# Patient Record
Sex: MALE | Race: WHITE | HISPANIC OR LATINO | Employment: STUDENT | URBAN - METROPOLITAN AREA
[De-identification: names, ages, dates, MRNs, and addresses within clinical notes are randomized per-mention and may not be internally consistent; named-entity substitution may affect disease eponyms.]

---

## 2022-10-13 ENCOUNTER — TRANSFERRED RECORDS (OUTPATIENT)
Dept: HEALTH INFORMATION MANAGEMENT | Facility: CLINIC | Age: 27
End: 2022-10-13

## 2022-10-13 LAB
C TRACH DNA SPEC QL PROBE+SIG AMP: NOT DETECTED
N GONORRHOEA DNA SPEC QL PROBE+SIG AMP: NOT DETECTED
SPECIMEN DESCRIP: NORMAL
SPECIMEN DESCRIPTION: NORMAL

## 2022-10-27 ENCOUNTER — TRANSFERRED RECORDS (OUTPATIENT)
Dept: HEALTH INFORMATION MANAGEMENT | Facility: CLINIC | Age: 27
End: 2022-10-27

## 2022-10-27 ENCOUNTER — MEDICAL CORRESPONDENCE (OUTPATIENT)
Dept: HEALTH INFORMATION MANAGEMENT | Facility: CLINIC | Age: 27
End: 2022-10-27

## 2022-10-28 ENCOUNTER — TELEPHONE (OUTPATIENT)
Dept: DERMATOLOGY | Facility: CLINIC | Age: 27
End: 2022-10-28

## 2022-10-28 ENCOUNTER — TRANSCRIBE ORDERS (OUTPATIENT)
Dept: OTHER | Age: 27
End: 2022-10-28

## 2022-10-28 DIAGNOSIS — L98.9 SKIN DISORDER: ICD-10-CM

## 2022-10-28 DIAGNOSIS — B37.49 YEAST DERMATITIS OF PENIS: Primary | ICD-10-CM

## 2022-10-28 NOTE — TELEPHONE ENCOUNTER
This encounter is being sent to inform the clinic that this patient has a referral from Dr Anat Beth MD at [facility] (Phone: [phone], Fax: [phone]) for the diagnoses of Yeast dermatitis of penis; Skin disorder; Pt with stinging/burning of penis, mild improvement with azole/hydrocortisone, but not resolved & returned after he stopped steroid. Also a scaly lesion to left corona query lichen sclorosis. and has requested that this patient be seen within Emergency: 1-2 Days and/or with Emergency: 1-2 Days.  Based on the availability of our provider(s), we are unable to accommodate this request.      Were all sites offered this patient?  Yes - Pt goes to [facility] so wants [facility] location only Please      Does scheduling algorithm request to schedule next available?  Patient appointment has not been scheduled.  Please review the referral request for accommodation and contact the patient.  If unable to accommodate, please resubmit a referral and indicate a preferred partner or affiliate location using Provider Finder or Scheduling Instructions field.        Pt said that next week he can come anytime on Monday or Wednesday or Friday (Pt cannot come on Tuesday or Thursday due to important school classes)   Please call Pt to add on Monday or Wednesday or Friday anytime next week at [facility] with any Dermatology provider Please  Thank you very much!!  Pt can be reached on his cell

## 2022-10-31 ENCOUNTER — TRANSFERRED RECORDS (OUTPATIENT)
Dept: HEALTH INFORMATION MANAGEMENT | Facility: CLINIC | Age: 27
End: 2022-10-31

## 2022-10-31 ENCOUNTER — MEDICAL CORRESPONDENCE (OUTPATIENT)
Dept: HEALTH INFORMATION MANAGEMENT | Facility: CLINIC | Age: 27
End: 2022-10-31

## 2022-11-02 ENCOUNTER — TRANSCRIBE ORDERS (OUTPATIENT)
Dept: OTHER | Age: 27
End: 2022-11-02

## 2022-11-02 DIAGNOSIS — B37.49 YEAST DERMATITIS OF PENIS: Primary | ICD-10-CM

## 2022-11-02 DIAGNOSIS — N34.2 URETHRITIS: ICD-10-CM

## 2022-11-09 ENCOUNTER — OFFICE VISIT (OUTPATIENT)
Dept: DERMATOLOGY | Facility: CLINIC | Age: 27
End: 2022-11-09
Payer: COMMERCIAL

## 2022-11-09 DIAGNOSIS — N48.1 BALANITIS: Primary | ICD-10-CM

## 2022-11-09 PROCEDURE — 99204 OFFICE O/P NEW MOD 45 MIN: CPT | Performed by: DERMATOLOGY

## 2022-11-09 RX ORDER — CLOTRIMAZOLE 1 %
CREAM (GRAM) TOPICAL 2 TIMES DAILY
COMMUNITY

## 2022-11-09 RX ORDER — HYDROXYZINE HYDROCHLORIDE 10 MG/1
TABLET, FILM COATED ORAL
COMMUNITY
Start: 2022-01-04

## 2022-11-09 ASSESSMENT — PAIN SCALES - GENERAL: PAINLEVEL: NO PAIN (0)

## 2022-11-09 NOTE — LETTER
11/9/2022       RE: Luther Lopez  [address] [place] [address]     Dear Colleague,    Thank you for referring your patient, Luther Lopez, to the [facility]. Please see a copy of my visit note below.    [facility] Dermatology Note  Encounter Date: Nov 9, 2022  Office Visit     Dermatology Problem List:  1. Balanitis NOS.   - clotrimazole 1% cream, hydrocortisone   ____________________________________________    Assessment & Plan:    #  Balanitis NOS, resolved on exam today. Suspect irritant dermatitis. New undiagnosed problem with uncertain prognosis. Recommended PRN use of topical therapies if flares. Did ask patient to avoid potential irritants to the area and to contact clinic with Mychart message and photo if rash were to recur and/or become more active. He expressed understanding and agreed to plan.   - Avoid scented lubricants/lotions  - Recommend using non-latex condoms   - Can restart hydrocortisone 2.5% ointment / clotrimazole BID PRN flares  - If symptoms return, recommend patient reach out over MyChart    Procedures Performed:   None.    Follow-up: prn for new or changing lesions    Staff and Scribe:     Scribe Disclosure:  I, LIEN WEDES, am serving as a scribe to document services personally performed by Wood Chamorro MD based on data collection and the provider's statements to me.     Provider Disclosure:   The documentation recorded by the scribe accurately reflects the services I personally performed and the decisions made by me.    Wood Chamorro MD    Department of Dermatology  [facility]: Phone: [phone], Fax:[phone]  [facility]: Phone: [phone] Fax: [phone]  ____________________________________________    CC: Derm Problem  (Pt reports itching, pain, soreness of penis in addition to a spot of concern in the same general area.)    HPI:  Mr. Ltuher Lopez is a(n) 27 year old male who presents today as a new patient for a rash on the groin.    Today, the patient reports itching, pain and soreness in the genital area, particularly around the tip of the penis and in the urethra. It began the first week of October and is slowly resolving. He was treating the area with hydrocortisone and clotrimazole.There was one discolored patch of skin in the area that was concerning to another physician.     Denies any visual symptoms or pain while urinating. He has had urine and STI tests previously that came back negative. He was with a new sexual partner and used latex condoms before these sensations began. Typically he uses non-latex condoms. Notes irritation after having sex.     Patient is otherwise feeling well, without additional skin concerns.    Labs Reviewed:  N/A    Physical Exam:  Vitals: There were no vitals taken for this visit.  SKIN: Focused examination of the mouth and genital was performed.  - Genital skin normal appearing.  - No other lesions of concern on areas examined.     Medications:  Current Outpatient Medications   Medication     clotrimazole (LOTRIMIN) 1 % external cream     hydrOXYzine (ATARAX) 10 MG tablet     No current facility-administered medications for this visit.      Past Medical History:   There is no problem list on file for this patient.    History reviewed. No pertinent past medical history.     CC Anat Beth MD  [address] [address] on close of this encounter.

## 2022-11-09 NOTE — PROGRESS NOTES
[facility] Dermatology Note  Encounter Date: Nov 9, 2022  Office Visit     Dermatology Problem List:  1. Balanitis NOS.   - clotrimazole 1% cream, hydrocortisone   ____________________________________________    Assessment & Plan:    #  Balanitis NOS, resolved on exam today. Suspect irritant dermatitis. New undiagnosed problem with uncertain prognosis. Recommended PRN use of topical therapies if flares. Did ask patient to avoid potential irritants to the area and to contact clinic with Mychart message and photo if rash were to recur and/or become more active. He expressed understanding and agreed to plan.   - Avoid scented lubricants/lotions  - Recommend using non-latex condoms   - Can restart hydrocortisone 2.5% ointment / clotrimazole BID PRN flares  - If symptoms return, recommend patient reach out over MyChart    Procedures Performed:   None.    Follow-up: prn for new or changing lesions    Staff and Scribe:     Scribe Disclosure:  I, LIEN CAI, am serving as a scribe to document services personally performed by Wood Chamorro MD based on data collection and the provider's statements to me.     Provider Disclosure:   The documentation recorded by the scribe accurately reflects the services I personally performed and the decisions made by me.    Wood Chamorro MD    Department of Dermatology  [facility]: Phone: [phone], Fax:[phone]  [facility]: Phone: [phone] Fax: [phone]  ____________________________________________    CC: Derm Problem (Pt reports itching, pain, soreness of penis in addition to a spot of concern in the same general area.)    HPI:  Mr. Luther Lopez is a(n) 27 year old male who presents today as a new patient for a rash on the groin.    Today, the patient reports itching, pain and soreness in the genital area, particularly  around the tip of the penis and in the urethra. It began the first week of October and is slowly resolving. He was treating the area with hydrocortisone and clotrimazole.There was one discolored patch of skin in the area that was concerning to another physician.     Denies any visual symptoms or pain while urinating. He has had urine and STI tests previously that came back negative. He was with a new sexual partner and used latex condoms before these sensations began. Typically he uses non-latex condoms. Notes irritation after having sex.     Patient is otherwise feeling well, without additional skin concerns.    Labs Reviewed:  N/A    Physical Exam:  Vitals: There were no vitals taken for this visit.  SKIN: Focused examination of the mouth and genital was performed.  - Genital skin normal appearing.  - No other lesions of concern on areas examined.     Medications:  Current Outpatient Medications   Medication     clotrimazole (LOTRIMIN) 1 % external cream     hydrOXYzine (ATARAX) 10 MG tablet     No current facility-administered medications for this visit.      Past Medical History:   There is no problem list on file for this patient.    History reviewed. No pertinent past medical history.     CC Anat Beth MD  [address] [address] on close of this encounter.

## 2022-11-09 NOTE — NURSING NOTE
Chief Complaint   Patient presents with     Derm Problem     Pt reports itching, pain, soreness of penis in addition to a spot of concern in the same general area.     Amari Coffey, EMT

## 2022-11-11 ENCOUNTER — TRANSFERRED RECORDS (OUTPATIENT)
Dept: HEALTH INFORMATION MANAGEMENT | Facility: CLINIC | Age: 27
End: 2022-11-11

## 2023-02-12 ENCOUNTER — HEALTH MAINTENANCE LETTER (OUTPATIENT)
Age: 28
End: 2023-02-12

## 2024-03-10 ENCOUNTER — HEALTH MAINTENANCE LETTER (OUTPATIENT)
Age: 29
End: 2024-03-10

## 2025-03-16 ENCOUNTER — HEALTH MAINTENANCE LETTER (OUTPATIENT)
Age: 30
End: 2025-03-16